# Patient Record
Sex: FEMALE | Race: OTHER | NOT HISPANIC OR LATINO | URBAN - METROPOLITAN AREA
[De-identification: names, ages, dates, MRNs, and addresses within clinical notes are randomized per-mention and may not be internally consistent; named-entity substitution may affect disease eponyms.]

---

## 2023-02-12 ENCOUNTER — EMERGENCY (EMERGENCY)
Facility: HOSPITAL | Age: 53
LOS: 1 days | Discharge: HOME | End: 2023-02-12
Attending: EMERGENCY MEDICINE
Payer: MEDICAID

## 2023-02-12 VITALS
DIASTOLIC BLOOD PRESSURE: 78 MMHG | HEIGHT: 64 IN | SYSTOLIC BLOOD PRESSURE: 119 MMHG | HEART RATE: 90 BPM | OXYGEN SATURATION: 97 % | TEMPERATURE: 99 F | RESPIRATION RATE: 16 BRPM | WEIGHT: 174.17 LBS

## 2023-02-12 DIAGNOSIS — Z90.11 ACQUIRED ABSENCE OF RIGHT BREAST AND NIPPLE: Chronic | ICD-10-CM

## 2023-02-12 DIAGNOSIS — Z98.890 OTHER SPECIFIED POSTPROCEDURAL STATES: Chronic | ICD-10-CM

## 2023-02-12 LAB
ALBUMIN SERPL ELPH-MCNC: 3.5 G/DL — SIGNIFICANT CHANGE UP (ref 3.5–5)
ALP SERPL-CCNC: 118 U/L — SIGNIFICANT CHANGE UP (ref 40–120)
ALT FLD-CCNC: 50 U/L DA — SIGNIFICANT CHANGE UP (ref 10–60)
ANION GAP SERPL CALC-SCNC: 2 MMOL/L — LOW (ref 5–17)
APPEARANCE UR: CLEAR — SIGNIFICANT CHANGE UP
AST SERPL-CCNC: 26 U/L — SIGNIFICANT CHANGE UP (ref 10–40)
BACTERIA # UR AUTO: ABNORMAL /HPF
BASOPHILS # BLD AUTO: 0.02 K/UL — SIGNIFICANT CHANGE UP (ref 0–0.2)
BASOPHILS NFR BLD AUTO: 0.2 % — SIGNIFICANT CHANGE UP (ref 0–2)
BILIRUB SERPL-MCNC: 0.3 MG/DL — SIGNIFICANT CHANGE UP (ref 0.2–1.2)
BILIRUB UR-MCNC: NEGATIVE — SIGNIFICANT CHANGE UP
BUN SERPL-MCNC: 17 MG/DL — SIGNIFICANT CHANGE UP (ref 7–18)
CALCIUM SERPL-MCNC: 8.5 MG/DL — SIGNIFICANT CHANGE UP (ref 8.4–10.5)
CHLORIDE SERPL-SCNC: 108 MMOL/L — SIGNIFICANT CHANGE UP (ref 96–108)
CK SERPL-CCNC: 82 U/L — SIGNIFICANT CHANGE UP (ref 21–215)
CO2 SERPL-SCNC: 29 MMOL/L — SIGNIFICANT CHANGE UP (ref 22–31)
COLOR SPEC: YELLOW — SIGNIFICANT CHANGE UP
CREAT SERPL-MCNC: 0.71 MG/DL — SIGNIFICANT CHANGE UP (ref 0.5–1.3)
DIFF PNL FLD: ABNORMAL
EGFR: 102 ML/MIN/1.73M2 — SIGNIFICANT CHANGE UP
EOSINOPHIL # BLD AUTO: 0.18 K/UL — SIGNIFICANT CHANGE UP (ref 0–0.5)
EOSINOPHIL NFR BLD AUTO: 2.1 % — SIGNIFICANT CHANGE UP (ref 0–6)
EPI CELLS # UR: SIGNIFICANT CHANGE UP /HPF
ERYTHROCYTE [SEDIMENTATION RATE] IN BLOOD: 17 MM/HR — SIGNIFICANT CHANGE UP (ref 0–20)
GLUCOSE SERPL-MCNC: 103 MG/DL — HIGH (ref 70–99)
GLUCOSE UR QL: NEGATIVE — SIGNIFICANT CHANGE UP
HCG SERPL-ACNC: 1 MIU/ML — SIGNIFICANT CHANGE UP
HCT VFR BLD CALC: 41.8 % — SIGNIFICANT CHANGE UP (ref 34.5–45)
HGB BLD-MCNC: 13.6 G/DL — SIGNIFICANT CHANGE UP (ref 11.5–15.5)
IMM GRANULOCYTES NFR BLD AUTO: 0.2 % — SIGNIFICANT CHANGE UP (ref 0–0.9)
KETONES UR-MCNC: NEGATIVE — SIGNIFICANT CHANGE UP
LEUKOCYTE ESTERASE UR-ACNC: ABNORMAL
LIDOCAIN IGE QN: 130 U/L — SIGNIFICANT CHANGE UP (ref 73–393)
LYMPHOCYTES # BLD AUTO: 0.76 K/UL — LOW (ref 1–3.3)
LYMPHOCYTES # BLD AUTO: 9 % — LOW (ref 13–44)
MAGNESIUM SERPL-MCNC: 2.2 MG/DL — SIGNIFICANT CHANGE UP (ref 1.6–2.6)
MCHC RBC-ENTMCNC: 27.9 PG — SIGNIFICANT CHANGE UP (ref 27–34)
MCHC RBC-ENTMCNC: 32.5 GM/DL — SIGNIFICANT CHANGE UP (ref 32–36)
MCV RBC AUTO: 85.7 FL — SIGNIFICANT CHANGE UP (ref 80–100)
MONOCYTES # BLD AUTO: 0.36 K/UL — SIGNIFICANT CHANGE UP (ref 0–0.9)
MONOCYTES NFR BLD AUTO: 4.2 % — SIGNIFICANT CHANGE UP (ref 2–14)
NEUTROPHILS # BLD AUTO: 7.14 K/UL — SIGNIFICANT CHANGE UP (ref 1.8–7.4)
NEUTROPHILS NFR BLD AUTO: 84.3 % — HIGH (ref 43–77)
NITRITE UR-MCNC: NEGATIVE — SIGNIFICANT CHANGE UP
NRBC # BLD: 0 /100 WBCS — SIGNIFICANT CHANGE UP (ref 0–0)
PH UR: 5 — SIGNIFICANT CHANGE UP (ref 5–8)
PLATELET # BLD AUTO: 260 K/UL — SIGNIFICANT CHANGE UP (ref 150–400)
POTASSIUM SERPL-MCNC: 3.9 MMOL/L — SIGNIFICANT CHANGE UP (ref 3.5–5.3)
POTASSIUM SERPL-SCNC: 3.9 MMOL/L — SIGNIFICANT CHANGE UP (ref 3.5–5.3)
PROT SERPL-MCNC: 7.3 G/DL — SIGNIFICANT CHANGE UP (ref 6–8.3)
PROT UR-MCNC: 15
RBC # BLD: 4.88 M/UL — SIGNIFICANT CHANGE UP (ref 3.8–5.2)
RBC # FLD: 12.4 % — SIGNIFICANT CHANGE UP (ref 10.3–14.5)
RBC CASTS # UR COMP ASSIST: ABNORMAL /HPF (ref 0–2)
SODIUM SERPL-SCNC: 139 MMOL/L — SIGNIFICANT CHANGE UP (ref 135–145)
SP GR SPEC: 1.02 — SIGNIFICANT CHANGE UP (ref 1.01–1.02)
TROPONIN I, HIGH SENSITIVITY RESULT: 4.1 NG/L — SIGNIFICANT CHANGE UP
UROBILINOGEN FLD QL: NEGATIVE — SIGNIFICANT CHANGE UP
WBC # BLD: 8.48 K/UL — SIGNIFICANT CHANGE UP (ref 3.8–10.5)
WBC # FLD AUTO: 8.48 K/UL — SIGNIFICANT CHANGE UP (ref 3.8–10.5)
WBC UR QL: ABNORMAL /HPF (ref 0–5)

## 2023-02-12 PROCEDURE — 93010 ELECTROCARDIOGRAM REPORT: CPT

## 2023-02-12 PROCEDURE — 99284 EMERGENCY DEPT VISIT MOD MDM: CPT

## 2023-02-12 RX ORDER — DIPHENHYDRAMINE HCL 50 MG
25 CAPSULE ORAL ONCE
Refills: 0 | Status: COMPLETED | OUTPATIENT
Start: 2023-02-12 | End: 2023-02-12

## 2023-02-12 RX ORDER — METOCLOPRAMIDE HCL 10 MG
10 TABLET ORAL ONCE
Refills: 0 | Status: COMPLETED | OUTPATIENT
Start: 2023-02-12 | End: 2023-02-12

## 2023-02-12 RX ORDER — KETOROLAC TROMETHAMINE 30 MG/ML
30 SYRINGE (ML) INJECTION ONCE
Refills: 0 | Status: DISCONTINUED | OUTPATIENT
Start: 2023-02-12 | End: 2023-02-12

## 2023-02-12 RX ORDER — SODIUM CHLORIDE 9 MG/ML
1000 INJECTION INTRAMUSCULAR; INTRAVENOUS; SUBCUTANEOUS
Refills: 0 | Status: DISCONTINUED | OUTPATIENT
Start: 2023-02-12 | End: 2023-02-16

## 2023-02-12 RX ADMIN — Medication 104 MILLIGRAM(S): at 21:00

## 2023-02-12 RX ADMIN — Medication 25 MILLIGRAM(S): at 21:00

## 2023-02-12 RX ADMIN — SODIUM CHLORIDE 125 MILLILITER(S): 9 INJECTION INTRAMUSCULAR; INTRAVENOUS; SUBCUTANEOUS at 21:00

## 2023-02-12 NOTE — ED PROVIDER NOTE - NSFOLLOWUPINSTRUCTIONS_ED_ALL_ED_FT
Dolor de jose general sin causa    General Headache Without Cause      El dolor de jose es un dolor o malestar que se siente en la coral de la jose o del ignacia. Hay muchas causas y tipos de ladan de jose. Entre los tipos más frecuentes, se incluyen los siguientes:  •Cefaleas tensionales.      •Cefaleas migrañosas.      •Cefalea en brotes.      •Cefaleas diarias crónicas.      En ocasiones, el dolor de jose puede no tener gerber causa específica.      Siga estas indicaciones en manjarrez casa:    Controle manjarrez afección para detectar cualquier cambio. Informe a manjarrez médico acerca de cualquier cambio. Siga estos pasos para controlar manjarrez afección:      Control del dolor       A bag of ice on a towel on the skin.        A heating pad for use on the painful area.      •Use los medicamentos de venta brea y los recetados solamente keny se lo haya indicado el médico. El tratamiento puede incluir medicamentos para el dolor que se merrill por boca o que se aplican sobre la piel.      •Cuando sienta dolor de jose acuéstese en un cuarto oscuro y tranquilo.      •Mantenga las luces tenues si las luces brillantes le molestan o gen ladan de jose empeoran.    •Si se lo indican, aplíquese hielo en la jose y en la coral del ignacia:  •Ponga el hielo en gerber bolsa plástica.      •Coloque gerber toalla entre la piel y la bolsa.      •Aplique el hielo carlie 20 minutos, 2 a 3 veces al día.      •Retire el hielo si la piel se pone de color schwartz brillante. [place] es muy importante. Si no puede sentir dolor, calor o frío, tiene un mayor riesgo de que se dañe la coral.      •Si se lo indican, aplique calor en la coral afectada. Use la holly de calor que el médico le recomiende, keny gerber compresa de calor húmedo o gerber almohadilla térmica.  •Coloque gerber toalla entre la piel y la holly de calor.      •Aplique calor carlie 20 a 30 minutos.      •Retire la holly de calor si la piel se pone de color schwartz brillante. [place] es especialmente importante si no puede sentir dolor, calor o frío. Corre un mayor riesgo de sufrir quemaduras.        Comida y bebida     •Mantenga un horario para las comidas.    •Si shelley alcohol:•Limite la cantidad que shelley a lo siguiente:  •De 0 a 1 medida por día para las mujeres que no están embarazadas.      • De 0 a 2 medidas por día para los hombres.         •Sepa cuánta cantidad de alcohol hay en las bebidas. En [place], gerber medida equivale a gerber botella de cerveza de 12 oz (355 ml), un vaso de vino de 5 oz (148 ml) o un vaso de gerber bebida alcohólica de jessica graduación de 1½ oz (44 ml).        •Deje de gregg cafeína o disminuya la cantidad que consume.      •Lala suficiente líquido keny para mantener la orina de color amarillo pálido.        Indicaciones generales   A person writing in a journal.  •Lleve un diario de los ladan de jose para averiguar qué factores pueden desencadenarlos. Registre, por ejemplo, lo siguiente:  •Lo que usted come y shelley.      •El tiempo que duerme.      •Algún cambio en manjarrez dieta o en los medicamentos.        •Pruebe algunas técnicas de relajación, keny los masajes.      •Limite el estrés.      •Siéntese con la espalda recta y no tense los músculos.      • No consuma ningún producto que contenga nicotina o tabaco. Estos productos incluyen cigarrillos, tabaco para mascar y aparatos de vapeo, keny los cigarrillos electrónicos. Si necesita ayuda para dejar de consumir estos productos, consulte al médico.      •David actividad física habitualmente keny se lo haya indicado el médico.      •Tenga un horario fijo para dormir. Duerma entre 7 y 9 horas todas las noches o la cantidad de horas que le haya recomendado el médico.      •Concurra a todas las visitas de seguimiento. [place] es importante.        Comuníquese con un médico si:    •Los medicamentos no le alivian los síntomas.      •Tiene un dolor de jose que es diferente de la cefalea habitual.      •Tiene náuseas o vómitos.      •Tiene fiebre.        Solicite ayuda de inmediato si:  •El dolor de jose:  •Se vuelve intenso rápidamente.      •Empeora después de hacer actividad física moderada o intensa.      •Tiene alguno de estos síntomas:  •Vómitos repetidos.      •Dolor o rigidez en el ignacia.      •Cambios en la visión.      •Dolor en un ethel o en un oído.      •Problemas para hablar.      •Debilidad muscular o pérdida del control muscular.      •Pérdida del equilibrio o de la coordinación.        •Sufre mareos o se desmaya.      •Experimenta confusión.      •Tiene gerber convulsión.      Estos síntomas pueden representar un problema grave que constituye gerber emergencia. No espere a jeff si los síntomas desaparecen. Solicite atención médica de inmediato. Comuníquese con el servicio de emergencias de manjarrez localidad (911 en los [place]). No conduzca por gen propios medios hasta el hospital.       Resumen    •El dolor de jose es un dolor o malestar que se siente en la coral de la jose o del ignacia.      •Hay muchas causas y tipos de ladan de jose. En algunos casos, es posible que no se encuentre la causa.      •Lleve un diario de los ladan de jose para averiguar qué factores pueden desencadenarlos. Controle manjarrez afección para detectar cualquier cambio. Informe a manjarrez médico acerca de cualquier cambio.      •Comuníquese con un médico si tiene un dolor de jose que es diferente de lo habitual o si los síntomas no se alivian con los medicamentos.      •Solicite ayuda de inmediato si el dolor se vuelve intenso, vomita, tiene pérdida de la visión, pierde el equilibrio o tiene gerber convulsión.      Esta información no tiene keny fin reemplazar el consejo del médico. Asegúrese de hacerle al médico cualquier pregunta que tenga.          Dolor de espalda lucia en los adultos    Acute Back Pain, Adult      El dolor de espalda lucia es repentino y por lo general no dura mucho tiempo. Se debe generalmente a gerber lesión de los músculos y tejidos de la espalda. La lesión puede ser el resultado de:  •Estiramiento en exceso o desgarro de un músculo, tendón o ligamento. Los ligamentos son tejidos que conectan los huesos. Levantar algo de forma incorrecta puede producir un esguince de espalda.      •Desgaste (degeneración) de los discos vertebrales. Los discos vertebrales son tejidos circulares que proporcionan amortiguación entre los huesos de la columna vertebral (vértebras).      •Movimientos de giro, keny al practicar deportes o realizar trabajos de jardinería.      •Un golpe en la espalda.      •Artritis.      Es posible que le realicen un examen físico, análisis de laboratorio u otros estudios de diagnóstico por imágenes para encontrar la causa del dolor. El dolor de espalda lucia generalmente desaparece con reposo y cuidados en la casa.      Siga estas instrucciones en manjarrez casa:    Control del dolor, la rigidez y la hinchazón     •Use los medicamentos de venta brea y los recetados solamente keny se lo haya indicado el médico. El tratamiento puede incluir medicamentos para el dolor y la inflamación que se merrill por la boca o que se aplican sobre la piel, o relajantes musculares.    •El médico puede recomendarle que se aplique hielo carlie las primeras 24 a 48 horas después del comienzo del dolor. Para hacer esto:  •Ponga el hielo en gerber bolsa plástica.      •Coloque gerber toalla entre la piel y la bolsa.      •Aplique el hielo carlie 20 minutos, 2 o 3 veces por día.      •Retire el hielo si la piel se pone de color schwartz brillante. [place] es muy importante. Si no puede sentir dolor, calor o frío, tiene un mayor riesgo de que se dañe la coral.      •Si se lo indican, aplique calor en la coral afectada con la frecuencia que le haya indicado el médico. Use la holly de calor que el médico le recomiende, keny gerber compresa de calor húmedo o gerber almohadilla térmica.  •Coloque gerber toalla entre la piel y la holly de calor.      •Aplique calor carlie 20 a 30 minutos.      •Retire la holly de calor si la piel se pone de color schwartz brillante. [place] es especialmente importante si no puede sentir dolor, calor o frío. Corre un mayor riesgo de sufrir quemaduras.          Actividad   Comparisons of good and bad posture while driving, standing, sitting at a desk, and lifting heavy objects.   • No permanezca en la cama. Hacer reposo en la cama por más de 1 a 2 días puede demorar manjarrez recuperación.    •Mantenga gerber buena postura al sentarse y pararse. No se incline hacia adelante al sentarse ni se encorve al pararse.  •Si trabaja en un escritorio, siéntese cerca de shruthi para no tener que inclinarse. Mantenga el mentón hacia abajo. Mantenga el ignacia hacia atrás y los codos flexionados en un ángulo de 90 grados (ángulo recto).      •Cuando conduzca, siéntese elevado y cerca del volante. Agregue un apoyo para la espalda (lumbar) al asiento del automóvil, si es necesario.        •Realice caminatas cortas en superficies camila tan pronto keny le sea posible. Trate de caminar un poco más de tiempo cada día.      • No se siente, conduzca o permanezca de pie en un mismo lugar carlie más de 30 minutos seguidos. Pararse o sentarse carlie largos períodos de tiempo puede sobrecargar la espalda.      • No conduzca ni use maquinaria pesada mientras nadja analgésicos recetados.    •Use técnicas apropiadas para levantar objetos. Cuando se inclina y levanta un objeto, utilice posiciones que no sobrecarguen tanto la espalda:  •Flexione las rodillas.      •Mantenga la carga cerca del cuerpo.      •No se tuerza.        •David actividad física habitualmente keny se lo haya indicado el médico. Hacer ejercicios ayuda a que la espalda sane más rápido y ayuda a evitar las lesiones de la espalda al mantener los músculos fox y flexibles.      •Trabaje con un fisioterapeuta para crear un programa de ejercicios seguros, según lo recomiende el médico. David ejercicios keny se lo haya indicado el fisioterapeuta.      Estilo de sadie     •Mantenga un peso saludable. El sobrepeso sobrecarga la espalda y hace que resulte difícil tener gerber buena postura.      •Evite actividades o situaciones que lo alicia sentirse ansioso o estresado. El estrés y la ansiedad aumentan la tensión muscular y pueden empeorar el dolor de espalda. Aprenda formas de manejar la ansiedad y el estrés, keny a través del ejercicio.      Instrucciones generales     •Duerma sobre un colchón firme en gerber posición cómoda. Intente acostarse de costado, con las rodillas ligeramente flexionadas. Si se recuesta sobre la espalda, coloque gerber almohada debajo de las rodillas.    •Mantenga la jose y el ignacia en línea recta con la columna vertebral (posición neutra) cuando use equipos electrónicos keny teléfonos inteligentes o tablets. Para hacer esto:  •Levante el teléfono inteligente o la tablet para mirarlo en lugar de inclinar la jose o el ignacia para mirar hacia abajo.      •Coloque el teléfono inteligente o la tablet al nivel de manjarrez marielle mientras dudley la pantalla.      •Siga el plan de tratamiento keny se lo haya indicado el médico. [place] puede incluir:  •Terapia cognitiva o conductual.      •Acupuntura o terapia de masajes.      •Yoga o meditación.          Comuníquese con un médico si:    •Siente un dolor que no se manuela con reposo o medicamentos.      •Siente mucho dolor que se extiende a las piernas o las nalgas.      •El dolor no mejora luego de 2 semanas.      •Siente dolor por la noche.      •Pierde peso sin proponérselo.      •Tiene fiebre o escalofríos.      •Siente náuseas o vómitos.      •Siente dolor abdominal.        Solicite ayuda de inmediato si:    •Tiene nuevos problemas para controlar la vejiga o los intestinos.      •Siente debilidad o adormecimiento inusuales en los brazos o en las piernas.      •Siente que va a desmayarse.      Estos síntomas pueden representar un problema grave que constituye gerber emergencia. No espere a jeff si los síntomas desaparecen. Solicite atención médica de inmediato. Comuníquese con el servicio de emergencias de manjarrez localidad (911 en los [place]). No conduzca por gen propios medios hasta el hospital.       Resumen    •El dolor de espalda lucia es repentino y por lo general no dura mucho tiempo.      •Use técnicas apropiadas para levantar objetos. Cuando se inclina y levanta un objeto, utilice posiciones que no sobrecarguen tanto la espalda.      •Glade los medicamentos de venta brea y los recetados solamente keny se lo haya indicado el médico, y aplíquese calor o hielo según las indicaciones.      Esta información no tiene keny fin reemplazar el consejo del médico. Asegúrese de hacerle al médico cualquier pregunta que tenga.      Neurology outpatient referral

## 2023-02-12 NOTE — ED ADULT TRIAGE NOTE - CHIEF COMPLAINT QUOTE
interpreting states " She have headache, low back pain and nausea started yesterday "  denies injury/fall

## 2023-02-12 NOTE — ED PROVIDER NOTE - OBJECTIVE STATEMENT
52 y.o. female postmenopausal, pt c/o on & off nonradiating Rt lower back since yest., pt had similar pain in the past, more intense yest., nausea, 9/10, no dysuria, hematuria, fever, chills, vomiting, numbness.  Pt took Ibuprofen 3 tabs with some relief.  Pt also c/o constant top of HA since yest., no trauma, photophobia, phonophobia, HA is diff. from previous, took Ibuprofen with no relief.

## 2023-02-12 NOTE — ED ADULT NURSE NOTE - OBJECTIVE STATEMENT
pt presents to ED with complaints of lower back pain, headache and nausea started yesterday. Denies chest pain and sob.

## 2023-02-12 NOTE — ED PROVIDER NOTE - CLINICAL SUMMARY MEDICAL DECISION MAKING FREE TEXT BOX
Patient with right-sided flank pain, concern for renal colic, muscle pain, unlikely pyelo-.  Will get labs, CT, give meds, IV fluids, reassess.  Patient also complaining of headache on top of her head, unlikely migraine, will get CT head, give meds, and reassess

## 2023-02-13 VITALS
OXYGEN SATURATION: 98 % | DIASTOLIC BLOOD PRESSURE: 57 MMHG | RESPIRATION RATE: 18 BRPM | TEMPERATURE: 98 F | SYSTOLIC BLOOD PRESSURE: 100 MMHG | HEART RATE: 80 BPM

## 2023-02-13 PROCEDURE — 99285 EMERGENCY DEPT VISIT HI MDM: CPT | Mod: 25

## 2023-02-13 PROCEDURE — 96374 THER/PROPH/DIAG INJ IV PUSH: CPT

## 2023-02-13 PROCEDURE — 70450 CT HEAD/BRAIN W/O DYE: CPT | Mod: 26,MA

## 2023-02-13 PROCEDURE — 83735 ASSAY OF MAGNESIUM: CPT

## 2023-02-13 PROCEDURE — 96375 TX/PRO/DX INJ NEW DRUG ADDON: CPT

## 2023-02-13 PROCEDURE — 70450 CT HEAD/BRAIN W/O DYE: CPT | Mod: MA

## 2023-02-13 PROCEDURE — 84484 ASSAY OF TROPONIN QUANT: CPT

## 2023-02-13 PROCEDURE — 81001 URINALYSIS AUTO W/SCOPE: CPT

## 2023-02-13 PROCEDURE — 85025 COMPLETE CBC W/AUTO DIFF WBC: CPT

## 2023-02-13 PROCEDURE — 80053 COMPREHEN METABOLIC PANEL: CPT

## 2023-02-13 PROCEDURE — 74176 CT ABD & PELVIS W/O CONTRAST: CPT | Mod: 26,MA

## 2023-02-13 PROCEDURE — 85652 RBC SED RATE AUTOMATED: CPT

## 2023-02-13 PROCEDURE — 74176 CT ABD & PELVIS W/O CONTRAST: CPT | Mod: MA

## 2023-02-13 PROCEDURE — 82550 ASSAY OF CK (CPK): CPT

## 2023-02-13 PROCEDURE — 36415 COLL VENOUS BLD VENIPUNCTURE: CPT

## 2023-02-13 PROCEDURE — 84702 CHORIONIC GONADOTROPIN TEST: CPT

## 2023-02-13 PROCEDURE — 83690 ASSAY OF LIPASE: CPT

## 2023-02-13 PROCEDURE — 87086 URINE CULTURE/COLONY COUNT: CPT

## 2023-02-13 PROCEDURE — 93005 ELECTROCARDIOGRAM TRACING: CPT

## 2023-02-13 RX ORDER — IBUPROFEN 200 MG
1 TABLET ORAL
Qty: 21 | Refills: 0
Start: 2023-02-13 | End: 2023-02-19

## 2023-02-13 RX ADMIN — Medication 30 MILLIGRAM(S): at 01:47

## 2023-02-14 LAB
CULTURE RESULTS: SIGNIFICANT CHANGE UP
SPECIMEN SOURCE: SIGNIFICANT CHANGE UP